# Patient Record
Sex: FEMALE | Race: WHITE | NOT HISPANIC OR LATINO | URBAN - METROPOLITAN AREA
[De-identification: names, ages, dates, MRNs, and addresses within clinical notes are randomized per-mention and may not be internally consistent; named-entity substitution may affect disease eponyms.]

---

## 2017-04-26 ENCOUNTER — OUTPATIENT (OUTPATIENT)
Dept: OUTPATIENT SERVICES | Facility: HOSPITAL | Age: 57
LOS: 1 days | End: 2017-04-26

## 2017-05-02 ENCOUNTER — OUTPATIENT (OUTPATIENT)
Dept: OUTPATIENT SERVICES | Facility: HOSPITAL | Age: 57
LOS: 1 days | End: 2017-05-02
Payer: COMMERCIAL

## 2017-05-03 PROCEDURE — 74220 X-RAY XM ESOPHAGUS 1CNTRST: CPT | Mod: 26

## 2021-11-19 ENCOUNTER — APPOINTMENT (OUTPATIENT)
Dept: DERMATOLOGY | Facility: CLINIC | Age: 61
End: 2021-11-19
Payer: COMMERCIAL

## 2021-11-19 PROCEDURE — 17110 DESTRUCTION B9 LES UP TO 14: CPT

## 2021-11-19 PROCEDURE — D0134: CPT

## 2021-11-19 PROCEDURE — 17000 DESTRUCT PREMALG LESION: CPT | Mod: 59

## 2021-11-19 PROCEDURE — 99203 OFFICE O/P NEW LOW 30 MIN: CPT | Mod: 25

## 2021-11-19 PROCEDURE — 17003 DESTRUCT PREMALG LES 2-14: CPT | Mod: 59

## 2022-11-16 ENCOUNTER — RESULT REVIEW (OUTPATIENT)
Age: 62
End: 2022-11-16

## 2022-11-17 ENCOUNTER — APPOINTMENT (OUTPATIENT)
Dept: DERMATOLOGY | Facility: CLINIC | Age: 62
End: 2022-11-17

## 2022-11-17 PROCEDURE — 11102 TANGNTL BX SKIN SINGLE LES: CPT | Mod: 59

## 2022-11-17 PROCEDURE — 17000 DESTRUCT PREMALG LESION: CPT | Mod: 59

## 2022-11-17 PROCEDURE — 17110 DESTRUCTION B9 LES UP TO 14: CPT

## 2022-11-17 PROCEDURE — 99213 OFFICE O/P EST LOW 20 MIN: CPT | Mod: 25

## 2023-11-17 ENCOUNTER — APPOINTMENT (OUTPATIENT)
Dept: DERMATOLOGY | Facility: CLINIC | Age: 63
End: 2023-11-17

## 2023-12-19 PROBLEM — Z00.00 ENCOUNTER FOR PREVENTIVE HEALTH EXAMINATION: Status: ACTIVE | Noted: 2023-12-19

## 2023-12-27 ENCOUNTER — APPOINTMENT (OUTPATIENT)
Dept: PULMONOLOGY | Facility: CLINIC | Age: 63
End: 2023-12-27
Payer: COMMERCIAL

## 2023-12-27 VITALS
DIASTOLIC BLOOD PRESSURE: 84 MMHG | BODY MASS INDEX: 25.23 KG/M2 | TEMPERATURE: 97.4 F | SYSTOLIC BLOOD PRESSURE: 124 MMHG | RESPIRATION RATE: 16 BRPM | HEART RATE: 102 BPM | WEIGHT: 157 LBS | OXYGEN SATURATION: 97 % | HEIGHT: 66 IN

## 2023-12-27 DIAGNOSIS — J84.9 INTERSTITIAL PULMONARY DISEASE, UNSPECIFIED: ICD-10-CM

## 2023-12-27 PROCEDURE — 99204 OFFICE O/P NEW MOD 45 MIN: CPT

## 2023-12-27 RX ORDER — ASPIRIN ENTERIC COATED TABLETS 81 MG 81 MG/1
81 TABLET, DELAYED RELEASE ORAL
Refills: 0 | Status: ACTIVE | COMMUNITY

## 2023-12-27 RX ORDER — OMEPRAZOLE 40 MG/1
40 CAPSULE, DELAYED RELEASE ORAL
Refills: 0 | Status: ACTIVE | COMMUNITY

## 2023-12-27 RX ORDER — AMLODIPINE BESYLATE 5 MG/1
5 TABLET ORAL DAILY
Refills: 0 | Status: ACTIVE | COMMUNITY

## 2023-12-27 RX ORDER — NINTEDANIB 100 MG/1
100 CAPSULE ORAL TWICE DAILY
Refills: 0 | Status: ACTIVE | COMMUNITY

## 2023-12-27 RX ORDER — MAGNESIUM GLUCONATE 27 MG(500)
TABLET ORAL
Refills: 0 | Status: ACTIVE | COMMUNITY

## 2023-12-27 RX ORDER — CHROMIUM 200 MCG
TABLET ORAL
Refills: 0 | Status: ACTIVE | COMMUNITY

## 2023-12-27 RX ORDER — PNV NO.95/FERROUS FUM/FOLIC AC 28MG-0.8MG
TABLET ORAL
Refills: 0 | Status: ACTIVE | COMMUNITY

## 2024-01-16 NOTE — HISTORY OF PRESENT ILLNESS
[Cough] : coughing [Wheezing] : wheezing [Nasal Passage Blockage (Stuffiness)] : edema [Nonspecific Pain, Swelling, And Stiffness] : chest pain [Fever] : fever [Difficulty Breathing During Exertion] : dyspnea on exertion [Feelings Of Weakness On Exertion] : exercise intolerance [TextBox_4] : Ms Ordaz presents for evaluation. I was previously following her at [facility] for Scl-ILD. In general since last visit she has not been the best. She thinks it may be due to overall stress which is causing arthritic and systemic symptoms of generalized fatigue. She is normally a person on the go, very active so she is concerned. Her breathing is largely the same, she is just feeling more fatigued easily and does not have the same energy level. In light of these symptoms she was re-started on Cellcept to manage the extra pulmonary symptoms. She remains on Ofev at 100 mg BID which she is tolerating well from a GI perspective. Reflux is well managed.

## 2024-01-16 NOTE — ASSESSMENT
[FreeTextEntry1] : Ms Ordaz is a 63 year old female with history of Scl-ILD presenting for follow up. She is nearly 10 years from diagnosis of Scl which  by probability should mean that lung disease should remain stable. At the current time however it seems that her extrapulmonary manifestations of the disease are active and causing debility rather than advancement of her lung disease. At this time would continue with Ofev 100 mg BID rather than rechallenging given stability of disease radiographically and from a time point perspective.  It may also worsen reflux which will be counter productive. I have advised her to follow up with Dr North to discuss further management of these symptoms- perhaps with higher dose Cellcept. Additionally her recent TTE is showing stable PA pressures nevertheless would be worthwhile for an evaluation with Dr. Jamil. She will follow up shorlty for PFTs to ensure that there is no changes in pulmonary status.  She had labwork within the past 3 months showing normal LFTs.

## 2024-01-16 NOTE — PHYSICAL EXAM
[No Acute Distress] : no acute distress [No Deformities] : no deformities [Well Developed] : well developed [Normal Oropharynx] : normal oropharynx [Normal Appearance] : normal appearance [No Neck Mass] : no neck mass [Normal Rate/Rhythm] : normal rate/rhythm [Normal S1, S2] : normal s1, s2 [Benign] : benign [Normal Gait] : normal gait [No Focal Deficits] : no focal deficits [Oriented x3] : oriented x3 [TextBox_68] : Crackles at the bases bilaterally.

## 2024-01-30 RX ORDER — NINTEDANIB 100 MG/1
100 CAPSULE ORAL TWICE DAILY
Qty: 180 | Refills: 1 | Status: ACTIVE | COMMUNITY
Start: 2023-12-27 | End: 1900-01-01

## 2024-03-06 ENCOUNTER — APPOINTMENT (OUTPATIENT)
Dept: PULMONOLOGY | Facility: CLINIC | Age: 64
End: 2024-03-06
Payer: COMMERCIAL

## 2024-03-06 ENCOUNTER — APPOINTMENT (OUTPATIENT)
Dept: INTERNAL MEDICINE | Facility: CLINIC | Age: 64
End: 2024-03-06
Payer: COMMERCIAL

## 2024-03-06 VITALS
RESPIRATION RATE: 16 BRPM | WEIGHT: 160 LBS | HEART RATE: 91 BPM | DIASTOLIC BLOOD PRESSURE: 82 MMHG | BODY MASS INDEX: 26.66 KG/M2 | OXYGEN SATURATION: 96 % | TEMPERATURE: 97.6 F | HEIGHT: 65 IN | SYSTOLIC BLOOD PRESSURE: 134 MMHG

## 2024-03-06 PROCEDURE — 99215 OFFICE O/P EST HI 40 MIN: CPT

## 2024-03-06 PROCEDURE — ZZZZZ: CPT

## 2024-03-06 RX ORDER — MYCOPHENOLATE MOFETIL 500 MG/1
500 TABLET ORAL TWICE DAILY
Refills: 0 | Status: ACTIVE | COMMUNITY

## 2024-03-06 NOTE — HISTORY OF PRESENT ILLNESS
[Cough] : coughing [Wheezing] : wheezing [Nasal Passage Blockage (Stuffiness)] : edema [Nonspecific Pain, Swelling, And Stiffness] : chest pain [Fever] : fever [Feelings Of Weakness On Exertion] : exercise intolerance [Difficulty Breathing During Exertion] : dyspnea on exertion [TextBox_4] : Ms Ordaz presents for follow of up of Scl-ILD. She has been on Ofev 100 mg BID for several years which she has been tolerating well. In the past year she was also restarted on Cellcept as she was having significant arthritic symptoms as well as fatigue. She just returned from [place]- feels like she is doing significantly better. She is still experiencing significant hip pain - imaging showing osteoarthritis.

## 2024-03-06 NOTE — ASSESSMENT
[FreeTextEntry1] : Ms Ordaz is a 63 year old female with history of Scl-ILD presenting for follow up. She is nearly 10 years from diagnosis of Scl which  by probability should mean that lung disease should remain stable.  Her PFTs are showing lthat they are largely stable though a nominal decline. Unclear how to interpret this as she had similar values in the past- as such may be representative of the natural variability that comes with this testing. As her symptoms are stable to improved will hold off on any changes at the moment. Repeat CT to be obtained in June- this will inform us of further management going forward.   Overnight sleep oximetry to be obtained as well.

## 2024-03-06 NOTE — PHYSICAL EXAM
[No Acute Distress] : no acute distress [Well Developed] : well developed [No Deformities] : no deformities [Normal Oropharynx] : normal oropharynx [Normal Appearance] : normal appearance [No Neck Mass] : no neck mass [Normal Rate/Rhythm] : normal rate/rhythm [Normal S1, S2] : normal s1, s2 [Benign] : benign [Normal Gait] : normal gait [No Clubbing] : no clubbing [No Edema] : no edema [Oriented x3] : oriented x3 [No Focal Deficits] : no focal deficits [TextBox_68] : Crackles at the bases bilaterally L>R [TextBox_105] : +sclerodactyly

## 2024-06-04 ENCOUNTER — APPOINTMENT (OUTPATIENT)
Dept: PULMONOLOGY | Facility: CLINIC | Age: 64
End: 2024-06-04
Payer: COMMERCIAL

## 2024-06-04 VITALS
OXYGEN SATURATION: 98 % | HEIGHT: 65 IN | WEIGHT: 157 LBS | DIASTOLIC BLOOD PRESSURE: 80 MMHG | TEMPERATURE: 97.6 F | RESPIRATION RATE: 15 BRPM | SYSTOLIC BLOOD PRESSURE: 120 MMHG | BODY MASS INDEX: 26.16 KG/M2 | HEART RATE: 82 BPM

## 2024-06-04 PROCEDURE — 99214 OFFICE O/P EST MOD 30 MIN: CPT

## 2024-06-04 NOTE — ASSESSMENT
[FreeTextEntry1] : Ms Ordaz is a 63 year old female with history of Scl-ILD presenting for follow up. She is nearly 10 years from diagnosis of Scl which  by probability should mean that lung disease should remain stable.  Her CT from this year, is in fact improved from last year which suggests that the Cellcept is working. At this time will continue with Ofev 100 mg BID as well as Cellcept given the UIP pattern with honeycombing which predicts worse mortality. Recent lab work showing normal LFTs and CBC. Recent TTE showing mild pulmonary hypertension which is unchanged. To repeat blood work in 3 months. Can follow up in 6 months or sooner if needed.

## 2024-06-04 NOTE — HISTORY OF PRESENT ILLNESS
[Cough] : coughing [Wheezing] : wheezing [Nasal Passage Blockage (Stuffiness)] : edema [Nonspecific Pain, Swelling, And Stiffness] : chest pain [Fever] : fever [Difficulty Breathing During Exertion] : dyspnea on exertion [Feelings Of Weakness On Exertion] : exercise intolerance [TextBox_4] : Ms Ordaz presents for follow of up of Scl-ILD. She has been on Ofev 100 mg BID for several years which she has been tolerating well. In the past year she was also restarted on Cellcept as she was having significant arthritic symptoms as well as fatigue.   Overall these symptoms are better. Fatigue is still there but she finds she is doing more and remains active. Still has pain in her right hip which is showing osteoarthritis.  Remains on 1000 mg BID of Cellcept.

## 2024-06-04 NOTE — PHYSICAL EXAM
[No Acute Distress] : no acute distress [No Deformities] : no deformities [Well Developed] : well developed [Normal Oropharynx] : normal oropharynx [Normal Appearance] : normal appearance [No Neck Mass] : no neck mass [Normal Rate/Rhythm] : normal rate/rhythm [Normal S1, S2] : normal s1, s2 [Benign] : benign [Normal Gait] : normal gait [No Edema] : no edema [No Focal Deficits] : no focal deficits [Oriented x3] : oriented x3 [TextBox_68] : Crackles at the bases bilaterally  [TextBox_105] : +sclerodactyly +mild clubbing.

## 2024-12-10 ENCOUNTER — APPOINTMENT (OUTPATIENT)
Dept: PULMONOLOGY | Facility: CLINIC | Age: 64
End: 2024-12-10
Payer: MEDICARE

## 2024-12-10 VITALS
TEMPERATURE: 97.8 F | HEART RATE: 90 BPM | SYSTOLIC BLOOD PRESSURE: 110 MMHG | WEIGHT: 158.13 LBS | OXYGEN SATURATION: 97 % | HEIGHT: 66 IN | BODY MASS INDEX: 25.41 KG/M2 | DIASTOLIC BLOOD PRESSURE: 78 MMHG

## 2024-12-10 PROCEDURE — 99214 OFFICE O/P EST MOD 30 MIN: CPT

## 2024-12-10 PROCEDURE — 99204 OFFICE O/P NEW MOD 45 MIN: CPT

## 2024-12-10 RX ORDER — CHOLECALCIFEROL (VITAMIN D3) 25 MCG
TABLET ORAL
Refills: 0 | Status: ACTIVE | COMMUNITY

## 2024-12-10 RX ORDER — AMLODIPINE BESYLATE 5 MG/1
5 TABLET ORAL
Refills: 0 | Status: ACTIVE | COMMUNITY

## 2024-12-10 RX ORDER — OMEPRAZOLE 40 MG/1
40 CAPSULE, DELAYED RELEASE ORAL
Refills: 0 | Status: ACTIVE | COMMUNITY

## 2024-12-10 RX ORDER — MYCOPHENOLATE MOFETIL 250 MG/1
CAPSULE ORAL
Refills: 0 | Status: ACTIVE | COMMUNITY

## 2024-12-10 RX ORDER — MAGNESIUM OXIDE/MAG AA CHELATE 300 MG
CAPSULE ORAL
Refills: 0 | Status: ACTIVE | COMMUNITY

## 2024-12-10 RX ORDER — POTASSIUM CHLORIDE 10 MEQ
CAPSULE, EXTENDED RELEASE ORAL
Refills: 0 | Status: ACTIVE | COMMUNITY

## 2025-03-11 ENCOUNTER — APPOINTMENT (OUTPATIENT)
Dept: PULMONOLOGY | Facility: CLINIC | Age: 65
End: 2025-03-11

## 2025-03-11 ENCOUNTER — APPOINTMENT (OUTPATIENT)
Dept: INTERNAL MEDICINE | Facility: CLINIC | Age: 65
End: 2025-03-11
Payer: MEDICARE

## 2025-03-11 VITALS
OXYGEN SATURATION: 99 % | RESPIRATION RATE: 16 BRPM | SYSTOLIC BLOOD PRESSURE: 125 MMHG | TEMPERATURE: 98.7 F | WEIGHT: 159 LBS | BODY MASS INDEX: 26.49 KG/M2 | DIASTOLIC BLOOD PRESSURE: 86 MMHG | HEIGHT: 65 IN | HEART RATE: 64 BPM

## 2025-03-11 DIAGNOSIS — U07.1 COVID-19: ICD-10-CM

## 2025-03-11 LAB
FLUAV SPEC QL CULT: NORMAL
FLUBV AG SPEC QL IA: NORMAL
SARS-COV-2 AG RESP QL IA.RAPID: POSITIVE

## 2025-03-11 PROCEDURE — ZZZZZ: CPT

## 2025-03-11 PROCEDURE — 87811 SARS-COV-2 COVID19 W/OPTIC: CPT | Mod: QW

## 2025-03-11 PROCEDURE — 94060 EVALUATION OF WHEEZING: CPT

## 2025-03-11 PROCEDURE — 94727 GAS DIL/WSHOT DETER LNG VOL: CPT

## 2025-03-11 PROCEDURE — 94729 DIFFUSING CAPACITY: CPT

## 2025-03-11 PROCEDURE — 87804 INFLUENZA ASSAY W/OPTIC: CPT | Mod: QW

## 2025-03-11 PROCEDURE — 99215 OFFICE O/P EST HI 40 MIN: CPT | Mod: 25

## 2025-03-11 RX ORDER — NIRMATRELVIR AND RITONAVIR 300-100 MG
20 X 150 MG & KIT ORAL
Qty: 30 | Refills: 0 | Status: ACTIVE | COMMUNITY
Start: 2025-03-11 | End: 1900-01-01

## 2025-05-07 ENCOUNTER — RX RENEWAL (OUTPATIENT)
Age: 65
End: 2025-05-07

## 2025-06-18 ENCOUNTER — TRANSCRIPTION ENCOUNTER (OUTPATIENT)
Age: 65
End: 2025-06-18

## 2025-06-18 ENCOUNTER — APPOINTMENT (OUTPATIENT)
Dept: PULMONOLOGY | Facility: CLINIC | Age: 65
End: 2025-06-18
Payer: MEDICARE

## 2025-06-18 VITALS
SYSTOLIC BLOOD PRESSURE: 112 MMHG | RESPIRATION RATE: 16 BRPM | OXYGEN SATURATION: 95 % | TEMPERATURE: 97.9 F | WEIGHT: 160 LBS | HEIGHT: 65 IN | DIASTOLIC BLOOD PRESSURE: 76 MMHG | BODY MASS INDEX: 26.66 KG/M2 | HEART RATE: 95 BPM

## 2025-06-18 PROCEDURE — 99214 OFFICE O/P EST MOD 30 MIN: CPT

## 2025-06-18 PROCEDURE — G2211 COMPLEX E/M VISIT ADD ON: CPT

## 2025-07-03 ENCOUNTER — RX RENEWAL (OUTPATIENT)
Age: 65
End: 2025-07-03

## 2025-08-04 ENCOUNTER — NON-APPOINTMENT (OUTPATIENT)
Age: 65
End: 2025-08-04

## 2025-08-06 ENCOUNTER — NON-APPOINTMENT (OUTPATIENT)
Age: 65
End: 2025-08-06

## 2025-08-06 ENCOUNTER — APPOINTMENT (OUTPATIENT)
Dept: GASTROENTEROLOGY | Facility: CLINIC | Age: 65
End: 2025-08-06
Payer: MEDICARE

## 2025-08-06 VITALS
SYSTOLIC BLOOD PRESSURE: 108 MMHG | RESPIRATION RATE: 16 BRPM | OXYGEN SATURATION: 96 % | HEART RATE: 94 BPM | BODY MASS INDEX: 26.66 KG/M2 | DIASTOLIC BLOOD PRESSURE: 74 MMHG | WEIGHT: 160 LBS | HEIGHT: 65 IN

## 2025-08-06 DIAGNOSIS — M34.9 SYSTEMIC SCLEROSIS, UNSPECIFIED: ICD-10-CM

## 2025-08-06 DIAGNOSIS — K21.9 GASTRO-ESOPHAGEAL REFLUX DISEASE W/OUT ESOPHAGITIS: ICD-10-CM

## 2025-08-06 PROCEDURE — 99204 OFFICE O/P NEW MOD 45 MIN: CPT

## 2025-08-08 PROBLEM — K21.9 GERD (GASTROESOPHAGEAL REFLUX DISEASE): Status: ACTIVE | Noted: 2025-08-06

## 2025-08-08 PROBLEM — M34.9 SYSTEMIC SCLEROSIS: Status: ACTIVE | Noted: 2025-08-06

## 2025-08-18 ENCOUNTER — TRANSCRIPTION ENCOUNTER (OUTPATIENT)
Age: 65
End: 2025-08-18

## 2025-08-21 ENCOUNTER — TRANSCRIPTION ENCOUNTER (OUTPATIENT)
Age: 65
End: 2025-08-21

## 2025-08-22 ENCOUNTER — RESULT REVIEW (OUTPATIENT)
Age: 65
End: 2025-08-22

## 2025-08-22 ENCOUNTER — OUTPATIENT (OUTPATIENT)
Dept: OUTPATIENT SERVICES | Facility: HOSPITAL | Age: 65
LOS: 1 days | End: 2025-08-22
Payer: MEDICARE

## 2025-08-22 ENCOUNTER — TRANSCRIPTION ENCOUNTER (OUTPATIENT)
Age: 65
End: 2025-08-22

## 2025-08-22 ENCOUNTER — APPOINTMENT (OUTPATIENT)
Dept: GASTROENTEROLOGY | Facility: HOSPITAL | Age: 65
End: 2025-08-22

## 2025-08-22 VITALS
DIASTOLIC BLOOD PRESSURE: 61 MMHG | TEMPERATURE: 98 F | HEART RATE: 82 BPM | HEIGHT: 66 IN | RESPIRATION RATE: 19 BRPM | WEIGHT: 154.98 LBS | SYSTOLIC BLOOD PRESSURE: 101 MMHG | OXYGEN SATURATION: 97 %

## 2025-08-22 VITALS
OXYGEN SATURATION: 100 % | RESPIRATION RATE: 16 BRPM | SYSTOLIC BLOOD PRESSURE: 110 MMHG | DIASTOLIC BLOOD PRESSURE: 71 MMHG | HEART RATE: 77 BPM

## 2025-08-22 DIAGNOSIS — K21.9 GASTRO-ESOPHAGEAL REFLUX DISEASE WITHOUT ESOPHAGITIS: ICD-10-CM

## 2025-08-22 DIAGNOSIS — Z98.891 HISTORY OF UTERINE SCAR FROM PREVIOUS SURGERY: Chronic | ICD-10-CM

## 2025-08-22 DIAGNOSIS — Z98.890 OTHER SPECIFIED POSTPROCEDURAL STATES: Chronic | ICD-10-CM

## 2025-08-22 DIAGNOSIS — Z90.89 ACQUIRED ABSENCE OF OTHER ORGANS: Chronic | ICD-10-CM

## 2025-08-22 DIAGNOSIS — M34.9 SYSTEMIC SCLEROSIS, UNSPECIFIED: ICD-10-CM

## 2025-08-22 PROCEDURE — 43239 EGD BIOPSY SINGLE/MULTIPLE: CPT

## 2025-08-22 PROCEDURE — 88342 IMHCHEM/IMCYTCHM 1ST ANTB: CPT | Mod: 26

## 2025-08-22 PROCEDURE — 88305 TISSUE EXAM BY PATHOLOGIST: CPT | Mod: 26

## 2025-08-22 PROCEDURE — 88305 TISSUE EXAM BY PATHOLOGIST: CPT

## 2025-08-22 PROCEDURE — 88342 IMHCHEM/IMCYTCHM 1ST ANTB: CPT

## 2025-08-22 RX ORDER — OMEPRAZOLE 20 MG/1
1 CAPSULE, DELAYED RELEASE ORAL
Refills: 0 | DISCHARGE

## 2025-08-22 RX ORDER — POTASSIUM CITRATE 5 MEQ/1
30 TABLET, EXTENDED RELEASE ORAL
Refills: 0 | DISCHARGE

## 2025-08-22 RX ORDER — CYANOCOBALAMIN 1000 UG/ML
0 INJECTION INTRAMUSCULAR; SUBCUTANEOUS
Refills: 0 | DISCHARGE

## 2025-08-22 RX ORDER — AMLODIPINE BESYLATE 10 MG/1
1 TABLET ORAL
Refills: 0 | DISCHARGE

## 2025-08-22 RX ORDER — ONDANSETRON HCL/PF 4 MG/2 ML
4 VIAL (ML) INJECTION ONCE
Refills: 0 | Status: ACTIVE | OUTPATIENT
Start: 2025-08-22 | End: 2026-07-21

## 2025-08-22 RX ORDER — ASPIRIN 325 MG
1 TABLET ORAL
Refills: 0 | DISCHARGE

## 2025-08-22 RX ORDER — MAGNESIUM CITRATE
125 SOLUTION, ORAL ORAL
Refills: 0 | DISCHARGE

## 2025-08-22 RX ORDER — HYDROXYZINE HYDROCHLORIDE 25 MG/1
1 TABLET, FILM COATED ORAL
Refills: 0 | DISCHARGE

## 2025-08-22 RX ORDER — ONABOTULINUMTOXINA 50 [USP'U]/1
100 INJECTION, POWDER, LYOPHILIZED, FOR SOLUTION INTRAMUSCULAR
Refills: 0 | DISCHARGE

## 2025-08-22 RX ORDER — MYCOPHENOLATE MOFETIL 500 MG/1
2 TABLET, FILM COATED ORAL
Refills: 0 | DISCHARGE

## 2025-08-22 RX ORDER — NINTEDANIB 100 MG/1
1 CAPSULE ORAL
Refills: 0 | DISCHARGE

## 2025-08-22 RX ADMIN — Medication 30 MILLILITER(S): at 09:00

## 2025-08-26 LAB — SURGICAL PATHOLOGY STUDY: SIGNIFICANT CHANGE UP

## 2025-09-08 ENCOUNTER — RX RENEWAL (OUTPATIENT)
Age: 65
End: 2025-09-08

## 2025-09-09 ENCOUNTER — APPOINTMENT (OUTPATIENT)
Dept: GASTROENTEROLOGY | Facility: HOSPITAL | Age: 65
End: 2025-09-09

## 2025-09-11 ENCOUNTER — APPOINTMENT (OUTPATIENT)
Dept: PULMONOLOGY | Facility: CLINIC | Age: 65
End: 2025-09-11
Payer: MEDICARE

## 2025-09-11 DIAGNOSIS — K20.80 OTHER ESOPHAGITIS WITHOUT BLEEDING: ICD-10-CM

## 2025-09-11 DIAGNOSIS — Z12.11 ENCOUNTER FOR SCREENING FOR MALIGNANT NEOPLASM OF COLON: ICD-10-CM

## 2025-09-11 RX ORDER — OMEPRAZOLE 40 MG/1
40 CAPSULE, DELAYED RELEASE ORAL TWICE DAILY
Qty: 120 | Refills: 1 | Status: ACTIVE | COMMUNITY
Start: 2025-09-11 | End: 1900-01-01

## 2025-09-11 RX ORDER — SODIUM SULFATE, POTASSIUM SULFATE AND MAGNESIUM SULFATE 1.6; 3.13; 17.5 G/177ML; G/177ML; G/177ML
17.5-3.13-1.6 SOLUTION ORAL
Qty: 2 | Refills: 0 | Status: ACTIVE | COMMUNITY
Start: 2025-09-11 | End: 1900-01-01

## 2025-09-15 ENCOUNTER — APPOINTMENT (OUTPATIENT)
Dept: PULMONOLOGY | Facility: CLINIC | Age: 65
End: 2025-09-15
Payer: MEDICARE

## 2025-09-15 VITALS
WEIGHT: 154 LBS | HEART RATE: 84 BPM | HEIGHT: 65 IN | BODY MASS INDEX: 25.66 KG/M2 | OXYGEN SATURATION: 98 % | SYSTOLIC BLOOD PRESSURE: 122 MMHG | RESPIRATION RATE: 16 BRPM | TEMPERATURE: 97.9 F | DIASTOLIC BLOOD PRESSURE: 80 MMHG

## 2025-09-15 PROCEDURE — G2211 COMPLEX E/M VISIT ADD ON: CPT

## 2025-09-15 PROCEDURE — 99214 OFFICE O/P EST MOD 30 MIN: CPT

## (undated) DEVICE — BALLOON US ENDO

## (undated) DEVICE — CATH IV SAFE BC 22G X 1" (BLUE)

## (undated) DEVICE — SUCTION YANKAUER NO CONTROL VENT

## (undated) DEVICE — SYR ALLIANCE II INFLATION 60ML

## (undated) DEVICE — TUBING SUCTION 20FT

## (undated) DEVICE — CATH IV SAFE BC 20G X 1.16" (PINK)

## (undated) DEVICE — PACK IV START WITH CHG

## (undated) DEVICE — BITE BLOCK ADULT 20 X 27MM (GREEN)

## (undated) DEVICE — SENSOR O2 FINGER ADULT

## (undated) DEVICE — SOL INJ NS 0.9% 500ML 2 PORT

## (undated) DEVICE — BIOPSY FORCEP RADIAL JAW 4 STANDARD WITH NEEDLE

## (undated) DEVICE — TUBING IV SET GRAVITY 3Y 100" MACRO

## (undated) DEVICE — TUBING SUCTION CONN 6FT STERILE

## (undated) DEVICE — FOLEY HOLDER STATLOCK 2 WAY ADULT